# Patient Record
Sex: FEMALE | Race: WHITE
[De-identification: names, ages, dates, MRNs, and addresses within clinical notes are randomized per-mention and may not be internally consistent; named-entity substitution may affect disease eponyms.]

---

## 2017-07-15 ENCOUNTER — HOSPITAL ENCOUNTER (EMERGENCY)
Dept: HOSPITAL 31 - C.ER | Age: 57
Discharge: HOME | End: 2017-07-15
Payer: SELF-PAY

## 2017-07-15 VITALS
DIASTOLIC BLOOD PRESSURE: 84 MMHG | OXYGEN SATURATION: 98 % | RESPIRATION RATE: 20 BRPM | HEART RATE: 68 BPM | SYSTOLIC BLOOD PRESSURE: 172 MMHG | TEMPERATURE: 97.9 F

## 2017-07-15 VITALS — BODY MASS INDEX: 29 KG/M2

## 2017-07-15 DIAGNOSIS — M54.41: ICD-10-CM
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
## 2017-07-15 DIAGNOSIS — M54.42: Primary | ICD-10-CM

## 2017-07-15 DIAGNOSIS — S76.911A: ICD-10-CM

## 2017-07-15 DIAGNOSIS — S76.912A: ICD-10-CM

## 2017-07-15 LAB
BILIRUB UR-MCNC: NEGATIVE MG/DL
GLUCOSE UR STRIP-MCNC: NORMAL MG/DL
LEUKOCYTE ESTERASE UR-ACNC: (no result) LEU/UL
PH UR STRIP: 5 [PH] (ref 5–8)
PROT UR STRIP-MCNC: NEGATIVE MG/DL
RBC # UR STRIP: NEGATIVE /UL
SP GR UR STRIP: 1 (ref 1–1.03)
SQUAMOUS EPITHIAL: < 1 /HPF (ref 0–5)
URINE NITRATE: NEGATIVE
UROBILINOGEN UR-MCNC: NORMAL MG/DL (ref 0.2–1)

## 2017-07-15 PROCEDURE — 96372 THER/PROPH/DIAG INJ SC/IM: CPT

## 2017-07-15 PROCEDURE — 81001 URINALYSIS AUTO W/SCOPE: CPT

## 2017-07-15 PROCEDURE — 99283 EMERGENCY DEPT VISIT LOW MDM: CPT

## 2017-07-15 PROCEDURE — 85378 FIBRIN DEGRADE SEMIQUANT: CPT

## 2017-07-15 NOTE — C.PDOC
History Of Present Illness


58 y/o female, history of asthma and hernia, presents to ED with c/o leg pain 

for 2 weeks. Patient states pain is sometimes in her knee and sometimes in her 

thigh, noting her legs sometimes "feel heavy". Notes pain worsens with 

movement. Patient also c/o right lower back pain and left shoulder pain, also 

worse with movement. Patient reports dysuria and frequency for 2 days. Denies 

fevers, chills, body aches, incontinence, saddle anaesthesia. 


Time Seen by Provider: 07/15/17 13:14


Chief Complaint (Nursing): Lower Extremity Problem/Injury


History Per: Patient


History/Exam Limitations: no limitations


Onset/Duration Of Symptoms: Days


Current Symptoms Are (Timing): Still Present


Recent travel outside of the [place]: No





Past Medical History


Reviewed: Historical Data, Nursing Documentation, Vital Signs


Vital Signs: 


 Last Vital Signs











Temp  97.8 F   07/15/17 13:09


 


Pulse  83   07/15/17 13:09


 


Resp  16   07/15/17 13:09


 


BP  167/88 H  07/15/17 13:09


 


Pulse Ox  97   07/15/17 14:01














- Medical History


PMH: Asthma, Diabetes, HTN, Hypercholesterolemia





- [facility] Procedures








COLONOSCOPY (13)


OTHER ENDOSCOPY OF SM INTEST (13)








Family History: States: Unknown Family Hx





- Social History


Hx Tobacco Use: No


Hx Alcohol Use: No


Hx Substance Use: No





- Immunization History


Hx Tetanus Toxoid Vaccination: Yes


Hx Influenza Vaccination: No


Hx Pneumococcal Vaccination: No





Review Of Systems


Except As Marked, All Systems Reviewed And Found Negative.


Constitutional: Negative for: Fever, Chills, Malaise


Cardiovascular: Negative for: Chest Pain, Palpitations


Respiratory: Negative for: Cough, Shortness of Breath


Gastrointestinal: Negative for: Nausea, Vomiting


Genitourinary: Positive for: Dysuria, Frequency


Musculoskeletal: Positive for: Shoulder Pain (Left), Leg Pain (BL)


Skin: Negative for: Rash


Neurological: Negative for: Weakness, Numbness





Physical Exam





- Physical Exam


Appears: Non-toxic, No Acute Distress


Skin: Normal Color, Warm, Dry


Head: Atraumatic, Normacephalic


Chest: Symmetrical


Cardiovascular: Rhythm Regular, No Murmur


Respiratory: Normal Breath Sounds, No Rales, No Rhonchi, No Wheezing


Gastrointestinal/Abdominal: Soft, No Tenderness, No Guarding, No Rebound


Back: No CVA Tenderness, Paraspinal Tenderness (R lumbar paraspinal tenderness. 

No left paralumbar tenderness. )


Extremity: Normal ROM, Tenderness (right buttock, left thigh, left knee ), 

Capillary Refill (< 2 sec.), No Deformity, No Swelling


Extremity: Bilateral: Normal Color And Temperature


Pulses: Left Dorsalis Pedis: Normal, Right Dorsalis Pedis: Normal


Neurological/Psych: Oriented x3, Normal Speech, Normal Cognition, Normal Motor, 

Normal Sensation





ED Course And Treatment


O2 Sat by Pulse Oximetry: 97 (RA)


Pulse Ox Interpretation: Normal





Medical Decision Making


Medical Decision Makin y/o female with multiple complaints: leg pain, right lower back pain, left 

shoulder pain, dysuria and frequency





Differential Diagnosis:


musculoskeletal, sciatica vs. DVT, secondary complaint: UTI 





Plan:


* Toradol


* D-dimer


* Urinalysis


* Reassess, dispo





Progress: 


UA negative. Dimer negative. Symptoms are most consistant with musculoskeletal. 

Advised patient to take motrin prn pain and to follow up with primary care 

doctor. 





Disposition


Counseled Patient/Family Regarding: Diagnosis, Need For Followup, Rx Given





- Disposition


Referrals: 


[facility] [Outside]


Disposition: HOME/ ROUTINE


Disposition Time: 14:52


Condition: IMPROVED


Additional Instructions: 





Ms Hernandes, thank you for letting us take care of you today. Your provider was 

Dr. Swan. You were treated for Sciatica, Leg and Knee Strain, Shoulder 

Strain. The emergency medical care you received today was directed at your 

acute symptoms. If you were prescribed any medication, please fill it and take 

as directed. It may take several days for your symptoms to resolve. Return to 

the Emergency Department if your symptoms worsen, do not improve, or if you 

have any other problems.





Please contact your doctor or call one of the physicians/clinics you have been 

referred to that are listed on the Patient Visit Information form that is 

included in your discharge packet. Bring any paperwork you were given at 

discharge with you along with any medications you are taking to your follow up 

visit. Our treatment cannot replace ongoing medical care by a primary care 

provider (PCP) outside of the emergency department.





Thank you for allowing the [facility] team to be part of your care today.








If you had an X-Ray or CT scan: A Radiologist will review the ED reading if any 

change in treatment is needed we will contact you.***





If you had a blood, urine, or wound culture: It will take several days for the 

results, if any change in treatment is needed we will contact you.***





If you had an STI test: It will take 48 hours for the results. Please call 

after 1 week if you have not heard back.***


Prescriptions: 


Ibuprofen [Motrin] 600 mg PO Q6 PRN #30 tab


 PRN Reason: Pain, Moderate (4-7)


Instructions:  Muscle Strain (ED), Sciatica (ED)


Forms:  General Discharge Instructions





- POA


Present On Arrival: None





- Clinical Impression


Clinical Impression: 


 Muscle strain, Sciatica








- Scribe Statement


The provider has reviewed the documentation as recorded by the Jona Montes


Provider Attestation: 








All medical record entries made by the Jona were at my direction and 

personally dictated by me. I have reviewed the chart and agree that the record 

accurately reflects my personal performance of the history, physical exam, 

medical decision making, and the department course for this patient. I have 

also personally directed, reviewed, and agree with the discharge instructions 

and disposition.

## 2018-07-10 ENCOUNTER — HOSPITAL ENCOUNTER (EMERGENCY)
Dept: HOSPITAL 31 - C.ER | Age: 58
Discharge: HOME | End: 2018-07-10
Payer: MEDICAID

## 2018-07-10 VITALS — OXYGEN SATURATION: 98 %

## 2018-07-10 VITALS — HEART RATE: 70 BPM | DIASTOLIC BLOOD PRESSURE: 81 MMHG | SYSTOLIC BLOOD PRESSURE: 145 MMHG

## 2018-07-10 VITALS — BODY MASS INDEX: 29 KG/M2

## 2018-07-10 VITALS — TEMPERATURE: 98.2 F

## 2018-07-10 VITALS — RESPIRATION RATE: 18 BRPM

## 2018-07-10 DIAGNOSIS — I10: ICD-10-CM

## 2018-07-10 DIAGNOSIS — R07.9: Primary | ICD-10-CM

## 2018-07-10 DIAGNOSIS — E78.00: ICD-10-CM
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
## 2018-07-10 DIAGNOSIS — E11.9: ICD-10-CM

## 2018-07-10 DIAGNOSIS — S90.02XA: ICD-10-CM

## 2018-07-10 DIAGNOSIS — R06.02: ICD-10-CM
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
## 2018-07-10 NOTE — C.PDOC
History Of Present Illness


58-year-old female, presents to the emergency department with complaints of 

chest pain developed suddenly during an argument with her son. Pt states she 

felt chest discomfort, and exacerbation of her asthma, while she was yelling. 

Notes a bookshelf was knocked over and hit the top of her left foot. She denies 

any nausea/vomiting, neck/arm pain, back pain or any other associated symptoms. 

no other complaints at this time. 


Time Seen by Provider: 07/10/18 15:41


Chief Complaint (Nursing): Shortness Of Breath


History Per: Patient


History/Exam Limitations: no limitations


Onset/Duration Of Symptoms: Other (prior to arrival)


Current Symptoms Are (Timing): Still Present





Past Medical History


Reviewed: Historical Data, Nursing Documentation, Vital Signs


Vital Signs: 


 Last Vital Signs











Temp  98.2 F   07/10/18 17:20


 


Pulse  70   07/10/18 17:20


 


Resp  18   07/10/18 17:20


 


BP  145/81   07/10/18 17:20


 


Pulse Ox  97   07/10/18 17:20














- Medical History


PMH: Asthma, Diabetes, GERD, HTN, Hypercholesterolemia





- [facility] Procedures








COLONOSCOPY (13)


OTHER ENDOSCOPY OF SM INTEST (13)








Family History: States: No Known Family Hx





- Social History


Hx Tobacco Use: No


Hx Alcohol Use: No


Hx Substance Use: No





- Immunization History


Hx Tetanus Toxoid Vaccination: Yes


Hx Influenza Vaccination: No


Hx Pneumococcal Vaccination: No





Review Of Systems


Constitutional: Negative for: Fever, Chills


Cardiovascular: Positive for: Chest Pain


Respiratory: Positive for: Shortness of Breath, Wheezing


Gastrointestinal: Negative for: Nausea, Vomiting, Abdominal Pain


Musculoskeletal: Positive for: Foot Pain.  Negative for: Arm Pain, Back Pain


Neurological: Negative for: Weakness, Numbness, Headache, Dizziness





Physical Exam





- Physical Exam


Appears: Non-toxic, No Acute Distress


Skin: Normal Color, Warm, Dry, No Rash


Head: Atraumatic, Normacephalic


Eye(s): bilateral: Normal Inspection, PERRL, EOMI


Nose: Normal


Oral Mucosa: Moist


Lips: Normal Appearing


Neck: Normal ROM


Chest: Symmetrical


Cardiovascular: Rhythm Regular, No Murmur


Respiratory: No Accessory Muscle Use, Wheezing (scant, mild expiratory)


Gastrointestinal/Abdominal: Normal Exam


Extremity: No Deformity, Other (superficial laceration to dorsum of left foot)


Pulses: Left Dorsalis Pedis: Normal, Right Dorsalis Pedis: Normal


Neurological/Psych: Oriented x3, Normal Speech





ED Course And Treatment


ECG: Interpreted By Me, Viewed By Me


ECG Rhythm: Sinus Rhythm


ECG Interpretation: No Acute Changes


Rate From EC


O2 Sat by Pulse Oximetry: 98 (RA)


Pulse Ox Interpretation: Normal





- Other Rad


  ** XR ANKLE


X-Ray: Viewed By Me, Read By Radiologist


Interpretation: Accession No. : [id number].  Patient Name / ID : HEMA [id number]  / [id number].  Exam Date : 07/10/2018 16:54:28 ( Approved ).  Study 

Comment :  Sex / Age : F  / 58Y.  Creator : Lottie Gallardo.  Dictator : Bruno Taylor MD.   :  Approver : Bruno Taylor MD.  

Approver2 :  Report Date : 07/10/2018 17:04:34.  My Comment :  *****************

******************************************************************.  Date of 

service:  07/10/2018.  PROCEDURE:  Left Ankle Radiographs.  HISTORY:  r/o 

fracture.  COMPARISON:  None.  FINDINGS:  BONES:  No acute fracture.  JOINTS:  

Ankle mortise maintained. Talar dome intact.  SOFT TISSUES:  Normal.  OTHER 

FINDINGS:  Tiny inferior plantar calcaneal spur.  IMPRESSION:  No demonstrated 

fracture or dislocation.





Medical Decision Making


Medical Decision Making: 





GERD and anxiety exacerbation after arguement with family


EKG normal


Pepcid and Maalox for GERD


prednisone and duoneb for mild asthma 


superficial contusion L ankle area, normal EKG


wound cleaned and bandaged.





Disposition


Doctor Will See Patient In The: Office


Counseled Patient/Family Regarding: Studies Performed, Diagnosis





- Disposition


Referrals: 


Ulysses Cody MD [Medical Doctor] - 


Disposition: HOME/ ROUTINE


Disposition Time: 17:06


Condition: GOOD


Additional Instructions: 


normal evaluation today


follow-up with Dr. Cody as needed





Instructions:  Asthma in Adults, Acid Reflux (Gastroesophageal Reflux Disease), 

Adult (DC), Contusion (DC)


Forms:  [facility] (English)





- Clinical Impression


Clinical Impression: 


 Contusion, SOB (shortness of breath), Chest discomfort








- Scribe Statement


The provider has reviewed the documentation as recorded by the Scribe (Cyndy Skelton)








All medical record entries made by the Scribe were at my direction and 

personally dictated by me. I have reviewed the chart and agree that the record 

accurately reflects my personal performance of the history, physical exam, 

medical decision making, and the department course for this patient. I have 

also personally directed, reviewed, and agree with the discharge instructions 

and disposition.

## 2018-07-10 NOTE — RAD
Date of service: 



07/10/2018



PROCEDURE:  Left Ankle Radiographs.



HISTORY:

r/o fracture  



COMPARISON:

None



FINDINGS:



BONES:

No acute fracture. 



JOINTS:

Ankle mortise maintained. Talar dome intact



SOFT TISSUES:

Normal. 



OTHER FINDINGS:

Tiny inferior plantar calcaneal spur.



IMPRESSION:

No demonstrated fracture or dislocation.

## 2018-07-12 NOTE — CARD
--------------- APPROVED REPORT --------------





Date of service: 07/10/2018



EKG Measurement

Heart [id number]

[id number]

[id number]

[id number]

[id number]



<Conclusion>

Normal sinus rhythm

Nonspecific T wave abnormality

Abnormal ECG